# Patient Record
(demographics unavailable — no encounter records)

---

## 2025-07-09 NOTE — HISTORY OF PRESENT ILLNESS
[FreeTextEntry1] : NNEKA MEYERS is a 28 year old male to female transgender patient with a history of gender dysphoria. She seeks consultation for facial feminization surgery and is followed by a Transgender team. She reports that she has been socially transitioning since . She has been medically transitioning since . She reports significant mental health history. Her past surgical history includes. She is currently in transgender care at [facility]. She denies cigarette use, drinks some alcohol, and occasionally smokes marijuana. She expresses understanding that she will need to abstain from smoking for 6 weeks before and 6 weeks after surgery. Patient denies history of previous gender affirming surgeries and gender affirming procedures such as Botox, silicone, fillers, liposuction and fat grafting. Patient denies personal and family medical history of clots, strokes, bleeding disorders and anesthesia problems. She reports that the male appearance of her face exacerbate her gender dysphoria and cause misgendering.

## 2025-07-11 NOTE — HISTORY OF PRESENT ILLNESS
[FreeTextEntry1] : NNEKA MEYERS is a 28 year old male to female transgender patient with a history of gender dysphoria. She seeks consultation for facial feminization surgery and is followed by a Transgender team. She reports that she has been socially transitioning since 4. She has been medically transitioning since 5 years ago. She reports significant mental health history and has been hospitalized twice, last when she was 19 years old. Her past surgical history includes intestinal surgery as a baby. She is currently in transgender care at [facility]. She denies cigarette use, drinks some alcohol, and denies marijuana.  Patient denies history of previous gender affirming surgeries and gender affirming procedures such as Botox, silicone, fillers, liposuction and fat grafting. Patient denies personal and family medical history of clots, strokes, bleeding disorders and anesthesia problems. She reports that the male appearance of her face exacerbate her gender dysphoria and cause misgendering.